# Patient Record
Sex: MALE | Employment: STUDENT | URBAN - METROPOLITAN AREA
[De-identification: names, ages, dates, MRNs, and addresses within clinical notes are randomized per-mention and may not be internally consistent; named-entity substitution may affect disease eponyms.]

---

## 2023-08-10 ENCOUNTER — TRANSFERRED RECORDS (OUTPATIENT)
Dept: HEALTH INFORMATION MANAGEMENT | Facility: CLINIC | Age: 18
End: 2023-08-10

## 2023-08-10 ENCOUNTER — MEDICAL CORRESPONDENCE (OUTPATIENT)
Dept: HEALTH INFORMATION MANAGEMENT | Facility: CLINIC | Age: 18
End: 2023-08-10

## 2023-08-14 ENCOUNTER — TRANSCRIBE ORDERS (OUTPATIENT)
Dept: OTHER | Age: 18
End: 2023-08-14

## 2023-08-14 DIAGNOSIS — Z30.09 VISIT FOR VASECTOMY EVALUATION: Primary | ICD-10-CM

## 2023-08-25 NOTE — TELEPHONE ENCOUNTER
Action 2023 JTV 10:18 PM    Action Taken CSS sent an urgent request for records to Nancy.     Action September 15, 2023 JTV 4:10 PM    Action Taken CSS called patient. Patient confirmed he will contact Nancy and have them fax over his medical records.      MEDICAL RECORDS REQUEST   [facility]  Urology Clinic  [address] [place] [address]  PHONE: [phone]  Fax: [phone]        FUTURE VISIT INFORMATION                                                   Edwardo Wayne, : 2005 scheduled for future visit at [facility]    APPOINTMENT INFORMATION:  Date: 2023  Provider:  Kennedy Villavicencio MD  Reason for Visit/Diagnosis: VASECTOMY CONSULT    REFERRAL INFORMATION:  Referring provider:  Colin Wells PA-C @ [facility]      RECORDS REQUESTED FOR VISIT                                                     NOTES  STATUS/DETAILS   OFFICE NOTE from referring provider Media tab 08/10/2023 -- Colin Wells PA-C @ [facility]   MEDICATION LIST  yes     PRE-VISIT CHECKLIST      Joint diagnostic appointment coordinated correctly          (ensure right order & amount of time) Yes   RECORD COLLECTION COMPLETE yes

## 2023-10-18 ENCOUNTER — PRE VISIT (OUTPATIENT)
Dept: UROLOGY | Facility: CLINIC | Age: 18
End: 2023-10-18
Payer: COMMERCIAL

## 2023-10-18 NOTE — CONFIDENTIAL NOTE
Reason for visit: vasectomy consult    Records/imaging/labs/orders: in epic    Insurance:  Does patient carry state insurance?  Yes  Examples of state insurance:  Medicaid - this is straight Medical Assistance  [facility] [place] product through "[employer] [facility] [place] product through [facility] PMAP  [employer] PMAP  [employer] [place]  [employer] PMAP  [facility] PMAP    If YES (Consent for Sterilization must be completed by patient and provider)    At Rooming: if video, please have consent for sterilization mailed out to pt to be completed      Christinademond Boyce  10/18/23  10:10 AM

## 2023-11-02 ENCOUNTER — VIRTUAL VISIT (OUTPATIENT)
Dept: UROLOGY | Facility: CLINIC | Age: 18
End: 2023-11-02
Payer: COMMERCIAL

## 2023-11-02 ENCOUNTER — PRE VISIT (OUTPATIENT)
Dept: UROLOGY | Facility: CLINIC | Age: 18
End: 2023-11-02

## 2023-11-02 DIAGNOSIS — Z30.09 VISIT FOR VASECTOMY EVALUATION: ICD-10-CM

## 2023-11-02 PROCEDURE — 99202 OFFICE O/P NEW SF 15 MIN: CPT | Mod: VID | Performed by: UROLOGY

## 2023-11-02 NOTE — LETTER
11/2/2023       RE: Edwardo Wayne  [address] [facility] [address]     Dear Colleague,    Thank you for referring your patient, Edwardo Wayne, to the [facility] UROLOGY CLINIC [place] [facility]. Please see a copy of my visit note below.    Virtual Visit Details    Type of service:  Video Visit       Originating Location (pt. Location): Home    Distant Location (provider location):  On-site  Platform used for Video Visit: Jesús    CC: Desires sterilization, consult for vasectomy.    HPI: Edwardo Wayne is a 18 year old male with no children, and he is intersted in getting a vasectomy for sterilization.  He has been thinking about vasectomy and researching this for years.     [place]/ [facility]? Yes, [facility]    PMH:   No chronic medical problems  Cataract surgery age 8.    FAMILY HX:   No history of coagulopathies.    ALLERGIES:    No Known Allergies    MEDS:   No prescription medications       SOCIAL HISTORY:  Social History     Tobacco Use    Smoking status: Never    Smokeless tobacco: Never        GENERAL PHYSICAL EXAM:   General- Alert, oriented, nad.  Pleasant and conversant.  Eyes- anicteric, EOMI.  Resps- normal, non-labored.  No cough  Abdomen-  nondistended.   exam- deferred.   Neurological - no tremors  Skin - no discoloration/ lesions noted  Psychiatric - no anxiety, alert & oriented.       The rest of a comprehensive physical examination is deferred due to video visit restrictions.       I discussed with him at length the risks and benefits of the procedure. He understands that this is a sterilization procedure, and not reversible contraception. He understands that reversals, while possible, are not guaranteed to work and fairly complex. I discussed with him the option of sperm cryopreservation.     I stressed that he continues to be fertile in the post-operative period, and that he should continue using  other contraceptive methods, such as a condom, until he obtains a semen analysis and we review the results to confirm success of the procedure and infertility. I also stressed to him that recanalization and pregnancy can occur in about 1 per thousand cases, possibly more even after we clear him with a semen analysis showing no motile sperm. I counseled him on the evelyn-operative risks of bleeding, infection, pain.  I described to him post-vasectomy pain syndrome that can occur in about 1 to 2% of men undergoing vasectomy.     We also discussed recovery times (typically days if no complications) and post-operative care including use of ice packs, pain medication and wound care.      Plan:  Schedule vasectomy procedure in clinic.   He would like to talk to finance office   [place]      Additional Coding Information:    Problems:  One acute uncomplicated illness or injury    Data Reviewed  Minimal or none    Level of risk:   low risk (e.g., OTC medication or observation, minor surgery without risks)    Time spent:  13 minutes spent on the date of the encounter doing chart review, history and exam, documentation and further activities as noted above. This included the video chat time above.      Kennedy Villavicencio MD

## 2023-11-02 NOTE — NURSING NOTE
Is the patient currently in the state of [place]? YES    Visit mode:VIDEO    If the visit is dropped, the patient can be reconnected by: VIDEO VISIT: Send to e-mail at: [email or web address] and TELEPHONE VISIT: Phone number:   Telephone Information:   Mobile [phone]       Will anyone else be joining the visit? NO  (If patient encounters technical issues they should call [phone]    How would you like to obtain your AVS? MyChart    Are changes needed to the allergy or medication list? Pt stated no med changes    Reason for visit: Video Visit (Vasectomy evaluation )    Nova HOPKINS

## 2024-01-09 ENCOUNTER — TELEPHONE (OUTPATIENT)
Dept: UROLOGY | Facility: CLINIC | Age: 19
End: 2024-01-09
Payer: COMMERCIAL

## 2024-01-09 NOTE — TELEPHONE ENCOUNTER
Left voice message for pt to call back urology scheduling to provide insurance info for upcoming procedure with Saud 2/8

## 2024-01-09 NOTE — TELEPHONE ENCOUNTER
----- Message from Dianne Carpenter RN sent at 1/8/2024  4:12 PM CST -----  Can someone call to get insurance for this patient please? Nothing is in the system currently and he is scheduled for a procedure soon  thanks  ----- Message -----  From: Dianne Carpenter RN  Sent: 1/5/2024  12:00 AM CST  To: Dianne Carpenter RN    Send the consent  ----- Message -----  From: Christina Boyce  Sent: 11/2/2023   7:47 AM CST  To: Rosa Nur; Dianne Carpenter RN    Hello,  Can you FedEx the consent for sterilization for this pt.    Thank you

## 2024-01-11 NOTE — TELEPHONE ENCOUNTER
Left message, advised pt to call to add health insurance and or to bring his card with at time of services

## 2024-01-23 ENCOUNTER — TELEPHONE (OUTPATIENT)
Dept: UROLOGY | Facility: CLINIC | Age: 19
End: 2024-01-23
Payer: COMMERCIAL

## 2024-01-23 ENCOUNTER — PRE VISIT (OUTPATIENT)
Dept: UROLOGY | Facility: CLINIC | Age: 19
End: 2024-01-23
Payer: COMMERCIAL

## 2024-01-23 DIAGNOSIS — Z30.2 ENCOUNTER FOR VASECTOMY: Primary | ICD-10-CM

## 2024-01-23 NOTE — CONFIDENTIAL NOTE
Patient coming in for vasectomy procedure     Sent mail to pt's home with itinerary and prep which included:     Stop all blood thinners for seven days prior to procedure  Eat a meal prior to procedure  Shave the hair from the base of the penis and scrotum the night prior to procedure.     Writer left generic voicemail for pt stating that his itinerary and prep for vasectomy procedure were sent to his home via [employer]. Also left clinic callback number for pt.    Christina Boyce  January 23, 2024  12:36 PM

## 2024-01-23 NOTE — TELEPHONE ENCOUNTER
"[facility] Call Center    Phone Message    May a detailed message be left on voicemail: yes     Reason for Call: Patient called about recent VM very upset, stating the address on file Is his parents address and \" I have not lived there for years, I did not give permission for this address to be in my chart, this is a violation of my privacy and I demand to know why this address is in the chart\" Writer updated the new address of where pt is living and was asked to have the clinic give him a call back to discuss. Please call pt back to discuss     Action Taken: Message routed to:  Other: uro    Travel Screening: Not Applicable                                                                   "

## 2024-05-15 ENCOUNTER — TRANSFERRED RECORDS (OUTPATIENT)
Dept: HEALTH INFORMATION MANAGEMENT | Facility: CLINIC | Age: 19
End: 2024-05-15

## 2024-05-15 LAB
ALT SERPL-CCNC: 51 U/L (ref 0–55)
AST SERPL-CCNC: 23 U/L (ref 5–34)
CHOLESTEROL (EXTERNAL): 195 MG/DL
CREATININE (EXTERNAL): 0.75 MG/DL (ref 0.72–1.25)
GFR ESTIMATED (EXTERNAL): >60 ML/MIN/1.73M2
GLUCOSE (EXTERNAL): 88 MG/DL (ref 70–124)
HDLC SERPL-MCNC: 77 MG/DL
LDL CHOLESTEROL CALCULATED (EXTERNAL): 97 MG/DL (ref 0–130)
POTASSIUM (EXTERNAL): 4.5 MMOL/L (ref 3.5–5.1)
TRIGLYCERIDES (EXTERNAL): 105 MG/DL (ref 20–150)

## 2024-05-19 ENCOUNTER — HEALTH MAINTENANCE LETTER (OUTPATIENT)
Age: 19
End: 2024-05-19

## 2024-06-13 ENCOUNTER — TRANSFERRED RECORDS (OUTPATIENT)
Dept: HEALTH INFORMATION MANAGEMENT | Facility: CLINIC | Age: 19
End: 2024-06-13

## 2024-06-14 NOTE — PROGRESS NOTES
[facility] Dermatology Note  Encounter Date: Jun 19, 2024  Office Visit     Dermatology Problem List:  1. Presentation consistent with Gender dysphoria-pending records    ____________________________________________    Assessment & Plan:    # hair removal on face, pt reports history of Gender dysphoria with recent diagnosis. Receiving mental health treatment outside provider in [place]. Would like LHR on facial hair.   - Has been on estrogen/progesterone since March 2024. Leaving as female since early 2024  - Records release requested today. Will need information to submit PA request to insurance.   - Reviewed LHR option. Reviewed the risks of laser use including dyspigmentation, scarring, blistering and need to avoid tan prior. Reviewed this would be considered cosmetic.    - Reviewed Tria laser option.   -consented for numbing with nursing and LMX use and risks  -reviewed avoid tan prior  -have signed release so that we may get [place] gender dysphoria records  -Reviewed each treatment will target existing hair bulbs, but to treat all hair multiple treatments will be needed. Patient informed laser hair removal is not permenant and hair may regrow years later. Reviewed this will not work for blond or white hair. Any blonde or white hair will need electrolysis. Recommend the 755 Reviewed that home laser units may not be as efficacious.   Area planned for treatment and reviewed with patient is face/neck.   Capone skin type II  We will initiate a letter to insurance to check for insurance coverage by  messaging the [employer] derm pool.   We have confirmed the patient is not on minoxidil  Shaving hair prior to LHRwill improve efficacy. Any plucking, waxing, electrolysis, or needs to be avoided at least 4 weeks before LHR. Adhere to strict sun avoidance for a minimum of 6 weeks before and after each treatment.   We reviewed with the patient that even with laser hair removal there is still risk for  hair growth. Red white and gray hair will not respond.     Electrolysis was offered as an alternative. We would need to refer outside our sytem.                  Procedures Performed:   none    Follow-up: send my chart in 2 weeks to check status of getting records. L    Staff and Scribe:     Scribe Disclosure:   I, Rosy Garcia, am serving as a scribe to document services personally performed by Aileen Pereira MD based on data collection and the provider's statements to me.     Provider Disclosure:   The documentation recorded by the scribe accurately reflects the services I personally performed and the decisions made by me.    Aileen Pereira MD    Department of Dermatology  [facility]: Phone: [phone], Fax:[phone]  [facility]: Phone: [phone], Fax: [phone]   ____________________________________________    CC: Derm Problem (Consult for laser hair removal on face.)    HPI:  Mr. Edwardo Wayne is a(n) 19 year old male who presents today as a new patient for LHR.    Patient is self-referred    Today, patient reports a previous diagnosis of gender dysphoria. Sex assigned at birth is male. Would like LHR on face. Has been on estrogen since March 2024. Has been living as female since beginning of 2024    No prior hair removal or toxin or filler or top or bottom surgery procedures.   No hx of keloids and hypertrophic scars.       Patient is otherwise feeling well, without additional skin concerns.    Labs Reviewed:  N/A    Physical Exam:  Vitals: There were no vitals taken for this visit.  SKIN: Focused examination of face was performed.  - terminal hair face, neck, Shahid 2, dark brown hair  - No other lesions of concern on areas examined.     Medications:  Current Outpatient Medications   Medication Sig Dispense Refill    B-D SYRINGE LUER-GEOVANY 1 ML MISC TO BE USED FOR  "INJECTIONS ONCE PER WEEK.      BD DISP NEEDLES 25G X 5/8\" MISC USE FOR INJECTING MEDICATION WEEKLY.      GOPI 0.1 MG/24HR bi-weekly patch APPLY ONE PATCH TWICE A WEEK      estradiol valerate (DELESTROGEN) 20 MG/ML injection ADMINISTER 0.25 ML (5MG) WEEKLY. DISCARD 28 DAYS AFTER FIRST USE.      Needle, Disp, (HYPODERMIC NEEDLE) 18G X 1-1/2\" MISC FOR DRAWING UP MEDICATION ONCE PER WEEK.      progesterone (PROMETRIUM) 100 MG capsule TAKE ONE Capsule (100mg) BY MOUTH AT BEDTIME      RETIN-A 0.025 % external cream apply TO clean DRY SKIN ONCE daily BEFORE bedtime. Need to see provider for further refills       Current Facility-Administered Medications   Medication Dose Route Frequency Provider Last Rate Last Admin    lidocaine (PF) (XYLOCAINE) injection 10 mL  10 mL Other Once Kennedy Villavicencio MD          Past Medical History:   There is no problem list on file for this patient.    No past medical history on file.     CC Referred Self, MD  No address on file on close of this encounter.    "

## 2024-06-19 ENCOUNTER — OFFICE VISIT (OUTPATIENT)
Dept: DERMATOLOGY | Facility: CLINIC | Age: 19
End: 2024-06-19
Payer: COMMERCIAL

## 2024-06-19 ENCOUNTER — TRANSFERRED RECORDS (OUTPATIENT)
Dept: HEALTH INFORMATION MANAGEMENT | Facility: CLINIC | Age: 19
End: 2024-06-19

## 2024-06-19 DIAGNOSIS — F64.9 GENDER DYSPHORIA: Primary | ICD-10-CM

## 2024-06-19 PROCEDURE — 99214 OFFICE O/P EST MOD 30 MIN: CPT | Performed by: DERMATOLOGY

## 2024-06-19 RX ORDER — NEEDLES, DISPOSABLE 25GX5/8"
NEEDLE, DISPOSABLE MISCELLANEOUS
COMMUNITY
Start: 2024-06-13

## 2024-06-19 RX ORDER — SYRINGE, DISPOSABLE, 1 ML
SYRINGE, EMPTY DISPOSABLE MISCELLANEOUS
COMMUNITY
Start: 2024-06-13

## 2024-06-19 RX ORDER — TRETINOIN 0.025 %
CREAM (GRAM) TOPICAL
COMMUNITY
Start: 2024-03-25

## 2024-06-19 RX ORDER — PROGESTERONE 100 MG/1
CAPSULE ORAL
COMMUNITY
Start: 2024-05-15

## 2024-06-19 RX ORDER — ESTRADIOL VALERATE 20 MG/ML
INJECTION INTRAMUSCULAR
COMMUNITY
Start: 2024-06-13

## 2024-06-19 RX ORDER — ESTRADIOL 0.1 MG/D
PATCH, EXTENDED RELEASE TRANSDERMAL
COMMUNITY
Start: 2024-05-17

## 2024-06-19 NOTE — Clinical Note
Please add to gender dysphoria laser hair removal list. When I get record of diagnosis can write the leter

## 2024-06-19 NOTE — NURSING NOTE
Dermatology Rooming Note    Edwardo Wayne's goals for this visit include:   Chief Complaint   Patient presents with    Derm Problem     Consult for laser hair removal on face.

## 2024-06-19 NOTE — LETTER
6/19/2024       RE: Edwardo Wayne  [address]  [facility] [address]     Dear Colleague,    Thank you for referring your patient, Edwardo Wayne, to the [facility] DERMATOLOGY CLINIC [place] [facility]. Please see a copy of my visit note below.      [facility] Dermatology Note  Encounter Date: Jun 19, 2024  Office Visit     Dermatology Problem List:  1. Presentation consistent with Gender dysphoria-pending records    ____________________________________________    Assessment & Plan:    # hair removal on face, pt reports history of Gender dysphoria with recent diagnosis. Receiving mental health treatment outside provider in [place]. Would like LHR on facial hair.   - Has been on estrogen/progesterone since March 2024. Leaving as female since early 2024  - Records release requested today. Will need information to submit PA request to insurance.   - Reviewed LHR option. Reviewed the risks of laser use including dyspigmentation, scarring, blistering and need to avoid tan prior. Reviewed this would be considered cosmetic.    - Reviewed Tria laser option.   -consented for numbing with nursing and LMX use and risks  -reviewed avoid tan prior  -have signed release so that we may get [place] gender dysphoria records  -Reviewed each treatment will target existing hair bulbs, but to treat all hair multiple treatments will be needed. Patient informed laser hair removal is not permenant and hair may regrow years later. Reviewed this will not work for blond or white hair. Any blonde or white hair will need electrolysis. Recommend the 755 Reviewed that home laser units may not be as efficacious.   Area planned for treatment and reviewed with patient is face/neck.   Capone skin type II  We will initiate a letter to insurance to check for insurance coverage by  messaging the [employer].   We have confirmed the patient is  not on minoxidil  Shaving hair prior to LHRwill improve efficacy. Any plucking, waxing, electrolysis, or needs to be avoided at least 4 weeks before LHR. Adhere to strict sun avoidance for a minimum of 6 weeks before and after each treatment.   We reviewed with the patient that even with laser hair removal there is still risk for hair growth. Red white and gray hair will not respond.     Electrolysis was offered as an alternative. We would need to refer outside our sytem.                  Procedures Performed:   none    Follow-up: send my chart in 2 weeks to check status of getting records. L    Staff and Scribe:     Scribe Disclosure:   I, Rosy Garcia, am serving as a scribe to document services personally performed by Aileen Pereira MD based on data collection and the provider's statements to me.     Provider Disclosure:   The documentation recorded by the scribe accurately reflects the services I personally performed and the decisions made by me.    Aileen Pereira MD    Department of Dermatology  [facility]: Phone: [phone], Fax:[phone]  [facility]: Phone: [phone], Fax: [phone]   ____________________________________________    CC: Derm Problem (Consult for laser hair removal on face.)    HPI:  Mr. Edwardo Wayne is a(n) 19 year old male who presents today as a new patient for LHR.    Patient is self-referred    Today, patient reports a previous diagnosis of gender dysphoria. Sex assigned at birth is male. Would like LHR on face. Has been on estrogen since March 2024. Has been living as female since beginning of 2024    No prior hair removal or toxin or filler or top or bottom surgery procedures.   No hx of keloids and hypertrophic scars.       Patient is otherwise feeling well, without additional skin concerns.    Labs Reviewed:  N/A    Physical Exam:  Vitals:  "There were no vitals taken for this visit.  SKIN: Focused examination of face was performed.  - terminal hair face, neck, Shahid 2, dark brown hair  - No other lesions of concern on areas examined.     Medications:  Current Outpatient Medications   Medication Sig Dispense Refill    B-D SYRINGE LUER-GEOVANY 1 ML MISC TO BE USED FOR INJECTIONS ONCE PER WEEK.      BD DISP NEEDLES 25G X 5/8\" MISC USE FOR INJECTING MEDICATION WEEKLY.      GOPI 0.1 MG/24HR bi-weekly patch APPLY ONE PATCH TWICE A WEEK      estradiol valerate (DELESTROGEN) 20 MG/ML injection ADMINISTER 0.25 ML (5MG) WEEKLY. DISCARD 28 DAYS AFTER FIRST USE.      Needle, Disp, (HYPODERMIC NEEDLE) 18G X 1-1/2\" MISC FOR DRAWING UP MEDICATION ONCE PER WEEK.      progesterone (PROMETRIUM) 100 MG capsule TAKE ONE Capsule (100mg) BY MOUTH AT BEDTIME      RETIN-A 0.025 % external cream apply TO clean DRY SKIN ONCE daily BEFORE bedtime. Need to see provider for further refills       Current Facility-Administered Medications   Medication Dose Route Frequency Provider Last Rate Last Admin    lidocaine (PF) (XYLOCAINE) injection 10 mL  10 mL Other Once Kennedy Villavicencio MD          Past Medical History:   There is no problem list on file for this patient.    No past medical history on file.     CC Referred Self,   "

## 2024-06-19 NOTE — PATIENT INSTRUCTIONS
Laser hair removal     [facility]    [facility]    [facility]       Electrolysis               [facility] Laser Hair Reduction Instructions    Laser hair reduction: I will have redness, pain and swelling. I may have skin discoloration, bruising and itching. Risks are permenant discoloration, hives, infection scarring, eye injury,hair growth, and blister. The outcome could be no improvement or slight improvement. Multiple treatments are required. All hair will not be removed.     Before the procedure:  Sun Protection:  Do not allow the area to become tan or come in with a tan for a treatment. Tans will increased the risks of the procedure. Do not use spray or any over counter product self tanners prior to the procedure.     Shaving:   Gently shave the area the evening before the procedure.     Other:  Avoid any retinols such as Differin, adapalene, retinol or tretinoin to the treated area 1 week prior. Hold bleaching creams such as hydroquinone until 1 week prior.  Avoid any hair removal procedures such as waxing, electrolysis or other lasers on the skin within 6 weeks prior. Do not have any other cosmetic procedures done on the area within the prior 6 weeks such as chemical peels or dermabrasion.      Post Procedure:  Day 1-7  The healing time for any given treatment varies between clients. The following represents the general recovery phases you might expect. Individual clients may experience variations from this course.     Swelling/Discomfort/Redness:  The most common side effects are erythema and edema (redness and swelling) which generally occur immediately after and typically resolve within 48 hours. If crusting and blistering occurs call the clinic.     Activity:  Avoid swimming the day of laser hair removal treatment. Also, no rigorous exercise the day of treatment.     Moisturizers and Sunscreens:  Wait until the skin has returned to normal to start topical products.      Sun Protection:  Do not allow the area to become tan or come in with a tan for a treatment. Tans will increased the risks of the procedure. Do not use spray or any over counter product self tanners prior to the procedure.     Warning signs:  Call the clinic if you have significant pain, increasing redness, pus, blisters/crusting or for any other concerns.     Who should I call with questions?  [facility]: [phone]  [facility]: [phone]  For urgent needs outside of business hours call the [facility] at [phone] and ask for the dermatology resident on call  [employer] messaging response may be delayed by several days

## 2024-07-03 ENCOUNTER — TELEPHONE (OUTPATIENT)
Dept: UROLOGY | Facility: CLINIC | Age: 19
End: 2024-07-03
Payer: COMMERCIAL

## 2024-07-03 NOTE — TELEPHONE ENCOUNTER
Left Voicemail (1st Attempt) for the patient to call back and schedule the following:    Appointment type: vasectomy   Provider: Dr. Villavicencio   Return date: Next available   Specialty phone number: [phone]  Additional appointment(s) needed: N/A  Additonal Notes: call to schedule his vasectomy with Dr Villavicencio in the clinic (at least 35 days from now) - per staff message

## 2024-07-05 NOTE — TELEPHONE ENCOUNTER
Left Voicemail (2nd Attempt) for the patient to call back and schedule the following:    Appointment type: Vasectomy   Provider: Dr. Villavicencio  Return date: Next available   Specialty phone number: [phone]  Additional appointment(s) needed: N/A  Additonal Notes: Per staff message - call to schedule his vasectomy with Dr Villavicencio in the clinic (at least 35 days from now)   Let Max know when so she can send out consent for the pt

## 2024-12-30 ENCOUNTER — NURSE TRIAGE (OUTPATIENT)
Dept: NURSING | Facility: CLINIC | Age: 19
End: 2024-12-30
Payer: COMMERCIAL

## 2024-12-31 NOTE — TELEPHONE ENCOUNTER
Patient states that he is still not able to fall asleep.  Patient is informed that his last disposition is to see a provider within two weeks.  His other options would be to try to be seen sooner, go to UC, or go to the ED. Patient is encourage to try other sleep aids like chamomile tea, warm bath, magnesium. Patient verbalized understanding of care advice.  Olya Lipscomb RN on 12/31/2024 at 1:29 AM

## 2024-12-31 NOTE — CONFIDENTIAL NOTE
Nurse Triage SBAR    Is this a 2nd Level Triage? NO    Situation: Patient calling    46 awake - not able to fall asleep  Melatonin   Don't feel tired or stimulated  Had adhd   does not take any meds    Background:     Assessment:  no impairments  [facility] patient. Patient is concerned and wants to do something about it.      Protocol Recommended Disposition:   Caller was informed of care advice. Patient should be evaluated per RN protocol: in the next two weeks. Caller verbalized understanding.      Zain Hewitt RN, BSN  Triage Nurse Advisor

## 2025-01-03 NOTE — TELEPHONE ENCOUNTER
"Reason for Disposition    Difficulty falling to sleep or staying asleep    Additional Information    Negative: Difficulty breathing    Negative: Depression is suspected    Negative: Traumatic Brain Injury (TBI) is suspected    Negative: Suspected alcohol withdrawal or unhealthy use of alcohol (drinking too much)    Negative: Suspected substance use (drug use), addiction, or withdrawal    Negative: [1] Pain is causing insomnia AND [2] pain is not a chronic symptom (recurrent or ongoing AND present > 4 weeks)    Negative: Insomnia interferes with work or school    Negative: [1] Pain is causing insomnia AND [2] pain is a chronic symptom (recurrent or ongoing AND present > 4 weeks)    Negative: [1] Insomnia persists > 1 week AND [2] no improvement after using Care Advice    Negative: Requesting medication for sleep (\"sleeping pill\")    Negative: Awakened  by jerking leg movements    Negative: [1] Restless legs or unpleasant feeling in legs AND [2] causes insomnia    Negative: Loud snoring is an ongoing problem  (> 2 weeks)    Negative: Excessive daytime sleepiness is an ongoing problem  (> 2 weeks)    Negative: Insomnia is an ongoing problem (> 2 weeks)    Protocols used: Insomnia-A-AH    " Calm